# Patient Record
Sex: FEMALE | Race: WHITE | NOT HISPANIC OR LATINO | Employment: OTHER | URBAN - NONMETROPOLITAN AREA
[De-identification: names, ages, dates, MRNs, and addresses within clinical notes are randomized per-mention and may not be internally consistent; named-entity substitution may affect disease eponyms.]

---

## 2018-02-02 ENCOUNTER — APPOINTMENT (OUTPATIENT)
Dept: GENERAL RADIOLOGY | Facility: HOSPITAL | Age: 78
End: 2018-02-02

## 2018-02-02 ENCOUNTER — HOSPITAL ENCOUNTER (EMERGENCY)
Facility: HOSPITAL | Age: 78
Discharge: HOME | End: 2018-02-02
Attending: EMERGENCY MEDICINE | Admitting: EMERGENCY MEDICINE

## 2018-02-02 VITALS
WEIGHT: 171 LBS | SYSTOLIC BLOOD PRESSURE: 150 MMHG | TEMPERATURE: 97.7 F | HEART RATE: 87 BPM | RESPIRATION RATE: 18 BRPM | DIASTOLIC BLOOD PRESSURE: 89 MMHG | OXYGEN SATURATION: 98 % | BODY MASS INDEX: 33.57 KG/M2 | HEIGHT: 60 IN
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
## 2018-02-02 DIAGNOSIS — M79.642 PAIN OF LEFT HAND: Primary | ICD-10-CM

## 2018-02-02 DIAGNOSIS — M79.661 PAIN OF RIGHT LOWER LEG: ICD-10-CM

## 2018-02-02 PROCEDURE — 73610 X-RAY EXAM OF ANKLE: CPT

## 2018-02-02 PROCEDURE — 73130 X-RAY EXAM OF HAND: CPT

## 2018-02-02 PROCEDURE — 73110 X-RAY EXAM OF WRIST: CPT

## 2018-02-02 PROCEDURE — 99283 EMERGENCY DEPT VISIT LOW MDM: CPT

## 2018-02-02 PROCEDURE — 73590 X-RAY EXAM OF LOWER LEG: CPT

## 2018-02-02 PROCEDURE — 73562 X-RAY EXAM OF KNEE 3: CPT

## 2018-02-02 RX ORDER — HYDROCODONE BITARTRATE AND ACETAMINOPHEN 7.5; 325 MG/1; MG/1
1 TABLET ORAL ONCE
Status: COMPLETED | OUTPATIENT
Start: 2018-02-02 | End: 2018-02-02

## 2018-02-02 RX ORDER — AMLODIPINE BESYLATE 10 MG/1
5 TABLET ORAL DAILY
COMMUNITY
End: 2018-02-05

## 2018-02-02 RX ORDER — HYDROCODONE BITARTRATE AND ACETAMINOPHEN 7.5; 325 MG/1; MG/1
1 TABLET ORAL EVERY 6 HOURS PRN
Qty: 12 TABLET | Refills: 0 | Status: SHIPPED | OUTPATIENT
Start: 2018-02-02 | End: 2018-02-05

## 2018-02-02 RX ADMIN — HYDROCODONE BITARTRATE AND ACETAMINOPHEN 1 TABLET: 7.5; 325 TABLET ORAL at 19:35

## 2018-02-05 ENCOUNTER — OFFICE VISIT (OUTPATIENT)
Dept: ORTHOPEDIC SURGERY | Facility: CLINIC | Age: 78
End: 2018-02-05

## 2018-02-05 VITALS — HEIGHT: 60 IN | BODY MASS INDEX: 34.55 KG/M2 | WEIGHT: 176 LBS

## 2018-02-05 DIAGNOSIS — S61.402A OPEN WOUND OF LEFT HAND WITHOUT FOREIGN BODY, UNSPECIFIED WOUND TYPE, INITIAL ENCOUNTER: ICD-10-CM

## 2018-02-05 DIAGNOSIS — S62.115A CLOSED NONDISPLACED FRACTURE OF TRIQUETRUM OF LEFT WRIST, INITIAL ENCOUNTER: ICD-10-CM
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
## 2018-02-05 DIAGNOSIS — M79.642 LEFT HAND PAIN: Primary | ICD-10-CM

## 2018-02-05 PROCEDURE — 99203 OFFICE O/P NEW LOW 30 MIN: CPT | Performed by: NURSE PRACTITIONER

## 2018-02-05 PROCEDURE — 26600 TREAT METACARPAL FRACTURE: CPT | Performed by: NURSE PRACTITIONER
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
## 2018-02-20 DIAGNOSIS — S62.115A CLOSED NONDISPLACED FRACTURE OF TRIQUETRUM OF LEFT WRIST, INITIAL ENCOUNTER: Primary | ICD-10-CM

## 2018-02-21 ENCOUNTER — OFFICE VISIT (OUTPATIENT)
Dept: ORTHOPEDIC SURGERY | Facility: CLINIC | Age: 78
End: 2018-02-21

## 2018-02-21 VITALS — WEIGHT: 169 LBS | HEIGHT: 60 IN | BODY MASS INDEX: 33.18 KG/M2

## 2018-02-21 DIAGNOSIS — M79.642 LEFT HAND PAIN: ICD-10-CM

## 2018-02-21 DIAGNOSIS — S62.115D CLOSED NONDISPLACED FRACTURE OF TRIQUETRUM OF LEFT WRIST WITH ROUTINE HEALING, SUBSEQUENT ENCOUNTER: Primary | ICD-10-CM

## 2018-02-21 PROCEDURE — 99214 OFFICE O/P EST MOD 30 MIN: CPT | Performed by: NURSE PRACTITIONER

## 2018-02-21 RX ORDER — AMLODIPINE BESYLATE 5 MG/1
TABLET ORAL
Refills: 4 | COMMUNITY
Start: 2018-01-22
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
## 2021-01-21 ENCOUNTER — IMMUNIZATION (OUTPATIENT)
Dept: VACCINE CLINIC | Facility: HOSPITAL | Age: 81
End: 2021-01-21

## 2021-01-21 PROCEDURE — 0001A: CPT | Performed by: NURSE PRACTITIONER

## 2021-01-21 PROCEDURE — 91300 HC SARSCOV02 VAC 30MCG/0.3ML IM: CPT | Performed by: NURSE PRACTITIONER

## 2021-02-11 ENCOUNTER — IMMUNIZATION (OUTPATIENT)
Dept: VACCINE CLINIC | Facility: HOSPITAL | Age: 81
End: 2021-02-11

## 2021-02-11 PROCEDURE — 91300 HC SARSCOV02 VAC 30MCG/0.3ML IM: CPT | Performed by: THORACIC SURGERY (CARDIOTHORACIC VASCULAR SURGERY)

## 2021-02-11 PROCEDURE — 0002A: CPT | Performed by: THORACIC SURGERY (CARDIOTHORACIC VASCULAR SURGERY)

## 2021-08-04 ENCOUNTER — TRANSCRIBE ORDERS (OUTPATIENT)
Dept: PODIATRY | Facility: CLINIC | Age: 81
End: 2021-08-04

## 2021-08-04 DIAGNOSIS — B35.1 FUNGAL INFECTION OF NAIL: Primary | ICD-10-CM

## 2021-08-25 ENCOUNTER — OFFICE VISIT (OUTPATIENT)
Dept: PODIATRY | Facility: CLINIC | Age: 81
End: 2021-08-25

## 2021-08-25 VITALS
OXYGEN SATURATION: 95 % | WEIGHT: 169 LBS | DIASTOLIC BLOOD PRESSURE: 82 MMHG | HEART RATE: 88 BPM | BODY MASS INDEX: 33.18 KG/M2 | SYSTOLIC BLOOD PRESSURE: 128 MMHG | HEIGHT: 60 IN

## 2021-08-25 DIAGNOSIS — M79.674 CHRONIC TOE PAIN, BILATERAL: ICD-10-CM

## 2021-08-25 DIAGNOSIS — B35.1 ONYCHOMYCOSIS: Primary | ICD-10-CM

## 2021-08-25 DIAGNOSIS — G89.29 CHRONIC TOE PAIN, BILATERAL: ICD-10-CM

## 2021-08-25 DIAGNOSIS — M79.675 CHRONIC TOE PAIN, BILATERAL: ICD-10-CM

## 2021-08-25 PROCEDURE — 99203 OFFICE O/P NEW LOW 30 MIN: CPT | Performed by: PODIATRIST

## 2021-08-25 PROCEDURE — 11721 DEBRIDE NAIL 6 OR MORE: CPT | Performed by: PODIATRIST

## 2021-08-25 RX ORDER — PRAVASTATIN SODIUM 20 MG
TABLET ORAL
COMMUNITY
Start: 2021-08-10

## 2021-08-25 RX ORDER — LEVOTHYROXINE SODIUM 0.07 MG/1
75 TABLET ORAL DAILY
COMMUNITY
Start: 2021-07-13

## 2021-08-25 RX ORDER — CHLORAL HYDRATE 500 MG
4 CAPSULE ORAL DAILY
COMMUNITY

## 2021-08-25 NOTE — PROGRESS NOTES
Анна Fierro  1940  81 y.o. female    Nondiabetic foot care and burning feet problem     08/25/2021    Chief Complaint   Patient presents with   • Left Foot - Pain, foot care   • Right Foot - Pain, foot care       History of Present Illness    Анна Fierro is a 81 y.o.female who presents to clinic today with chief complaint of toe pain.  Pain is caused by long, thick toenails.  She is unable to trim them due to the thickness.  She also complains of burning in both of her feet.      Past Medical History:   Diagnosis Date   • Bunion    • Hypertension          Past Surgical History:   Procedure Laterality Date   • BLADDER REPAIR     • HYSTERECTOMY     • REPLACEMENT TOTAL KNEE           Family History   Problem Relation Age of Onset   • Osteoporosis Mother    • Diabetes Father    • Heart disease Maternal Aunt    • Heart disease Maternal Uncle    • Osteoporosis Maternal Grandmother    • Heart disease Maternal Grandmother    • Cancer Maternal Grandfather        No Known Allergies    Social History     Socioeconomic History   • Marital status:      Spouse name: Not on file   • Number of children: Not on file   • Years of education: Not on file   • Highest education level: Not on file   Tobacco Use   • Smoking status: Never Smoker         Current Outpatient Medications   Medication Sig Dispense Refill   • amLODIPine (NORVASC) 5 MG tablet TAKE 1 TABLET BY MOUTH DAILY  4   • ascorbic acid (VITAMIN C) 1000 MG tablet Take 1,000 mg by mouth Daily.     • ATENOLOL PO Take  by mouth.     • Calcium 500-125 MG-UNIT tablet Take 1 tablet by mouth Daily.     • levothyroxine (SYNTHROID, LEVOTHROID) 75 MCG tablet Take 75 mcg by mouth Daily.     • Omega-3 Fatty Acids (fish oil) 1000 MG capsule capsule Take 4 capsules by mouth Daily.     • pravastatin (PRAVACHOL) 20 MG tablet        No current facility-administered medications for this visit.       Review of Systems   HENT: Positive for hearing loss.    Respiratory: Positive for  "cough and shortness of breath.    Musculoskeletal: Positive for arthralgias.   Skin: Negative.    Neurological: Positive for numbness.   Psychiatric/Behavioral: Negative.          OBJECTIVE    /82   Pulse 88   Ht 152.4 cm (60\")   Wt 76.7 kg (169 lb)   SpO2 95%   BMI 33.01 kg/m²     Physical Exam  Vitals reviewed.   Constitutional:       General: She is not in acute distress.     Appearance: She is well-developed.   HENT:      Head: Normocephalic and atraumatic.      Nose: Nose normal.   Eyes:      Conjunctiva/sclera: Conjunctivae normal.      Pupils: Pupils are equal, round, and reactive to light.   Pulmonary:      Effort: Pulmonary effort is normal. No respiratory distress.      Breath sounds: No wheezing.   Musculoskeletal:         General: Tenderness present. No deformity. Normal range of motion.   Skin:     General: Skin is warm and dry.      Capillary Refill: Capillary refill takes less than 2 seconds.   Neurological:      Mental Status: She is alert and oriented to person, place, and time.   Psychiatric:         Behavior: Behavior normal.         Thought Content: Thought content normal.          Lower Extremity Exam:     Cardiovascular:    DP/PT pulses palpable b/l    CFT brisk  to all digits b/l  Skin temp is warm to warm from proximal tibia to distal digits b/l  Pedal hair growth present b/l  No erythema or edema noted b/l  Musculoskeletal:  Muscle strength is 5/5 for all muscle groups tested b/l  ROM of the 1st MTP is WNL b/l  ROM of the TMTJ is WNL b/l  ROM of the MTJ is WNL b/l  ROM of the STJ is  WNL b/l  ROM of the ankle joint is  WNL b/l  Dermatological:   Webspaces 1-4 b/l are clean, dry and intact.   No subcutaneous nodules or masses noted  b/l  No open wounds noted b/l  Nails 1-5 b/l are thickened, discolored, elongated with subungual debris.  Pain on palpation to the nail plates.  Neurological:   Protective sensation intact b/l  Sensation intact to light touch b/l       Foot/Ankle " Exam        Procedures        ASSESSMENT AND PLAN    Diagnoses and all orders for this visit:    1. Onychomycosis (Primary)    2. Chronic toe pain, bilateral        - Comprehensive foot and ankle exam performed.   - Nails 1-5 bilateral were debrided in length and thickness with nail nipper and electric  to decrease fungal load and risk of infection.   - All questions were answered to the patients satisfaction.  - RTC 3 months as needed            This document has been electronically signed by Robert Peoples DPM on August 25, 2021 13:37 CDT     8/25/2021  13:37 CDT

## 2021-12-01 ENCOUNTER — OFFICE VISIT (OUTPATIENT)
Dept: PODIATRY | Facility: CLINIC | Age: 81
End: 2021-12-01

## 2021-12-01 VITALS
DIASTOLIC BLOOD PRESSURE: 86 MMHG | OXYGEN SATURATION: 94 % | SYSTOLIC BLOOD PRESSURE: 151 MMHG | HEIGHT: 60 IN | WEIGHT: 169 LBS | BODY MASS INDEX: 33.18 KG/M2 | HEART RATE: 84 BPM

## 2021-12-01 DIAGNOSIS — M79.674 CHRONIC TOE PAIN, BILATERAL: ICD-10-CM

## 2021-12-01 DIAGNOSIS — G89.29 CHRONIC TOE PAIN, BILATERAL: ICD-10-CM

## 2021-12-01 DIAGNOSIS — B35.1 ONYCHOMYCOSIS: Primary | ICD-10-CM

## 2021-12-01 DIAGNOSIS — M79.675 CHRONIC TOE PAIN, BILATERAL: ICD-10-CM

## 2021-12-01 PROCEDURE — 11721 DEBRIDE NAIL 6 OR MORE: CPT | Performed by: PODIATRIST

## 2021-12-01 RX ORDER — AMLODIPINE BESYLATE 5 MG/1
TABLET ORAL DAILY
COMMUNITY
Start: 2021-09-13

## 2021-12-01 NOTE — PROGRESS NOTES
Анна Fierro  1940  81 y.o. female    Nondiabetic foot care     12/01/2021     Chief Complaint   Patient presents with   • Left Foot - non diabetic foot care   • Right Foot - non diabetic foot care       History of Present Illness    Анна Fierro is a 81 y.o.female who presents to clinic today for foot care.      Past Medical History:   Diagnosis Date   • Bunion    • Hypertension          Past Surgical History:   Procedure Laterality Date   • BLADDER REPAIR     • HYSTERECTOMY     • REPLACEMENT TOTAL KNEE           Family History   Problem Relation Age of Onset   • Osteoporosis Mother    • Diabetes Father    • Heart disease Maternal Aunt    • Heart disease Maternal Uncle    • Osteoporosis Maternal Grandmother    • Heart disease Maternal Grandmother    • Cancer Maternal Grandfather        No Known Allergies    Social History     Socioeconomic History   • Marital status:    Tobacco Use   • Smoking status: Never Smoker   • Smokeless tobacco: Never Used   Vaping Use   • Vaping Use: Never used   Substance and Sexual Activity   • Sexual activity: Defer         Current Outpatient Medications   Medication Sig Dispense Refill   • amLODIPine (NORVASC) 5 MG tablet TAKE 1 TABLET BY MOUTH DAILY  4   • amLODIPine (NORVASC) 5 MG tablet Daily.     • ascorbic acid (VITAMIN C) 1000 MG tablet Take 1,000 mg by mouth Daily.     • ATENOLOL PO Take  by mouth.     • Calcium 500-125 MG-UNIT tablet Take 1 tablet by mouth Daily.     • levothyroxine (SYNTHROID, LEVOTHROID) 75 MCG tablet Take 75 mcg by mouth Daily.     • Omega-3 Fatty Acids (fish oil) 1000 MG capsule capsule Take 4 capsules by mouth Daily.     • pravastatin (PRAVACHOL) 20 MG tablet        No current facility-administered medications for this visit.       Review of Systems   HENT: Positive for hearing loss.    Respiratory: Positive for cough and shortness of breath.    Musculoskeletal: Positive for arthralgias.   Skin: Negative.    Neurological: Positive for numbness.  "  Psychiatric/Behavioral: Negative.          OBJECTIVE    /86   Pulse 84   Ht 152.4 cm (60\")   Wt 76.7 kg (169 lb)   SpO2 94%   BMI 33.01 kg/m²     Physical Exam  Vitals reviewed.   Constitutional:       General: She is not in acute distress.     Appearance: She is well-developed.   HENT:      Head: Normocephalic and atraumatic.      Nose: Nose normal.   Eyes:      Conjunctiva/sclera: Conjunctivae normal.      Pupils: Pupils are equal, round, and reactive to light.   Pulmonary:      Effort: Pulmonary effort is normal. No respiratory distress.      Breath sounds: No wheezing.   Musculoskeletal:         General: Tenderness present. No deformity. Normal range of motion.   Skin:     General: Skin is warm and dry.      Capillary Refill: Capillary refill takes less than 2 seconds.   Neurological:      Mental Status: She is alert and oriented to person, place, and time.   Psychiatric:         Behavior: Behavior normal.         Thought Content: Thought content normal.          Lower Extremity Exam:     Cardiovascular:    DP/PT pulses palpable b/l    CFT brisk  to all digits b/l  Skin temp is warm to warm from proximal tibia to distal digits b/l  Pedal hair growth present b/l  No erythema or edema noted b/l  Musculoskeletal:  Muscle strength is 5/5 for all muscle groups tested b/l  ROM of the 1st MTP is WNL b/l  ROM of the TMTJ is WNL b/l  ROM of the MTJ is WNL b/l  ROM of the STJ is  WNL b/l  ROM of the ankle joint is  WNL b/l  Dermatological:   Webspaces 1-4 b/l are clean, dry and intact.   No subcutaneous nodules or masses noted  b/l  No open wounds noted b/l  Nails 1-5 b/l are thickened, discolored, elongated with subungual debris.  Pain on palpation to the nail plates.  Neurological:   Protective sensation intact b/l  Sensation intact to light touch b/l       Foot/Ankle Exam        Procedures        ASSESSMENT AND PLAN    Diagnoses and all orders for this visit:    1. Onychomycosis (Primary)    2. Chronic toe " pain, bilateral          - Nails 1-5 bilateral were debrided in length and thickness with nail nipper and electric  to decrease fungal load and risk of infection.   - All questions were answered to the patients satisfaction.  - RTC 3 months as needed            This document has been electronically signed by Robert Peoples DPM on December 3, 2021 08:51 CST     12/3/2021  08:51 CST

## 2022-03-07 ENCOUNTER — OFFICE VISIT (OUTPATIENT)
Dept: PODIATRY | Facility: CLINIC | Age: 82
End: 2022-03-07

## 2022-03-07 VITALS — BODY MASS INDEX: 33.18 KG/M2 | OXYGEN SATURATION: 97 % | WEIGHT: 169 LBS | HEIGHT: 60 IN | HEART RATE: 72 BPM

## 2022-03-07 DIAGNOSIS — G89.29 CHRONIC TOE PAIN, BILATERAL: ICD-10-CM

## 2022-03-07 DIAGNOSIS — M79.674 CHRONIC TOE PAIN, BILATERAL: ICD-10-CM

## 2022-03-07 DIAGNOSIS — M79.675 CHRONIC TOE PAIN, BILATERAL: ICD-10-CM

## 2022-03-07 DIAGNOSIS — B35.1 ONYCHOMYCOSIS: Primary | ICD-10-CM

## 2022-03-07 PROCEDURE — 11721 DEBRIDE NAIL 6 OR MORE: CPT | Performed by: PODIATRIST

## 2022-03-07 NOTE — PROGRESS NOTES
Анна Fierro  1940  81 y.o. female    Nondiabetic foot care     03/07/2022     Chief Complaint   Patient presents with   • Left Foot - Follow-up     Non diabetic foot care    • Right Foot - Follow-up     Non diabetic foot care        History of Present Illness    Анна Fierro is a 81 y.o.female who presents to clinic today for foot care.      Past Medical History:   Diagnosis Date   • Bunion    • Hypertension          Past Surgical History:   Procedure Laterality Date   • BLADDER REPAIR     • HYSTERECTOMY     • REPLACEMENT TOTAL KNEE           Family History   Problem Relation Age of Onset   • Osteoporosis Mother    • Diabetes Father    • Heart disease Maternal Aunt    • Heart disease Maternal Uncle    • Osteoporosis Maternal Grandmother    • Heart disease Maternal Grandmother    • Cancer Maternal Grandfather        No Known Allergies    Social History     Socioeconomic History   • Marital status:    Tobacco Use   • Smoking status: Never Smoker   • Smokeless tobacco: Never Used   Vaping Use   • Vaping Use: Never used   Substance and Sexual Activity   • Alcohol use: Defer   • Drug use: Defer   • Sexual activity: Defer         Current Outpatient Medications   Medication Sig Dispense Refill   • amLODIPine (NORVASC) 5 MG tablet TAKE 1 TABLET BY MOUTH DAILY  4   • amLODIPine (NORVASC) 5 MG tablet Daily.     • ascorbic acid (VITAMIN C) 1000 MG tablet Take 1,000 mg by mouth Daily.     • ATENOLOL PO Take  by mouth.     • Calcium 500-125 MG-UNIT tablet Take 1 tablet by mouth Daily.     • levothyroxine (SYNTHROID, LEVOTHROID) 75 MCG tablet Take 75 mcg by mouth Daily.     • Omega-3 Fatty Acids (fish oil) 1000 MG capsule capsule Take 4 capsules by mouth Daily.     • pravastatin (PRAVACHOL) 20 MG tablet        No current facility-administered medications for this visit.       Review of Systems   HENT: Positive for hearing loss.    Respiratory: Positive for cough and shortness of breath.    Musculoskeletal: Positive  "for arthralgias.   Skin: Negative.    Neurological: Positive for numbness.   Psychiatric/Behavioral: Negative.          OBJECTIVE    Pulse 72   Ht 152.4 cm (60\")   Wt 76.7 kg (169 lb)   SpO2 97%   BMI 33.01 kg/m²     Physical Exam  Vitals reviewed.   Constitutional:       General: She is not in acute distress.     Appearance: She is well-developed.   HENT:      Head: Normocephalic and atraumatic.      Nose: Nose normal.   Eyes:      Conjunctiva/sclera: Conjunctivae normal.      Pupils: Pupils are equal, round, and reactive to light.   Pulmonary:      Effort: Pulmonary effort is normal. No respiratory distress.      Breath sounds: No wheezing.   Musculoskeletal:         General: Tenderness present. No deformity. Normal range of motion.   Skin:     General: Skin is warm and dry.      Capillary Refill: Capillary refill takes less than 2 seconds.   Neurological:      Mental Status: She is alert and oriented to person, place, and time.   Psychiatric:         Behavior: Behavior normal.         Thought Content: Thought content normal.          Lower Extremity Exam:     Cardiovascular:    DP/PT pulses palpable b/l    CFT brisk  to all digits b/l  Skin temp is warm to warm from proximal tibia to distal digits b/l  Pedal hair growth present b/l  No erythema or edema noted b/l  Musculoskeletal:  Muscle strength is 5/5 for all muscle groups tested b/l  ROM of the 1st MTP is WNL b/l  ROM of the TMTJ is WNL b/l  ROM of the MTJ is WNL b/l  ROM of the STJ is  WNL b/l  ROM of the ankle joint is  WNL b/l  Dermatological:   Webspaces 1-4 b/l are clean, dry and intact.   No subcutaneous nodules or masses noted  b/l  No open wounds noted b/l  Nails 1-5 b/l are thickened, discolored, elongated with subungual debris.  Pain on palpation to the nail plates.  Neurological:   Protective sensation intact b/l  Sensation intact to light touch b/l       Foot/Ankle Exam        Procedures        ASSESSMENT AND PLAN    Diagnoses and all orders " for this visit:    1. Onychomycosis (Primary)    2. Chronic toe pain, bilateral          - Nails 1-5 bilateral were debrided in length and thickness with nail nipper and electric  to decrease fungal load and risk of infection.  An ABN was signed by the patient prior to nail debridement.  - All questions were answered to the patients satisfaction.  - RTC 3 months as needed            This document has been electronically signed by Robert Peoples DPM on March 7, 2022 13:19 CST     3/7/2022  13:19 CST

## 2022-07-27 ENCOUNTER — OFFICE VISIT (OUTPATIENT)
Dept: PODIATRY | Facility: CLINIC | Age: 82
End: 2022-07-27

## 2022-07-27 VITALS — HEIGHT: 60 IN | HEART RATE: 102 BPM | WEIGHT: 169 LBS | BODY MASS INDEX: 33.18 KG/M2 | OXYGEN SATURATION: 98 %

## 2022-07-27 DIAGNOSIS — G89.29 CHRONIC TOE PAIN, BILATERAL: ICD-10-CM

## 2022-07-27 DIAGNOSIS — M79.674 CHRONIC TOE PAIN, BILATERAL: ICD-10-CM

## 2022-07-27 DIAGNOSIS — B35.1 ONYCHOMYCOSIS: Primary | ICD-10-CM

## 2022-07-27 DIAGNOSIS — M79.675 CHRONIC TOE PAIN, BILATERAL: ICD-10-CM

## 2022-07-27 PROCEDURE — 11721 DEBRIDE NAIL 6 OR MORE: CPT | Performed by: PODIATRIST

## 2022-07-27 NOTE — PROGRESS NOTES
Анна Fierro  1940  81 y.o. female        7/27/2022    Chief Complaint   Patient presents with   • Left Foot - Follow-up     NON Diabetic foot care    • Right Foot - Follow-up     NON Diabetic foot care        History of Present Illness    Анна Fierro is a 81 y.o.female who presents to clinic today for foot care.      Past Medical History:   Diagnosis Date   • Bunion    • Hypertension          Past Surgical History:   Procedure Laterality Date   • BLADDER REPAIR     • HYSTERECTOMY     • REPLACEMENT TOTAL KNEE           Family History   Problem Relation Age of Onset   • Osteoporosis Mother    • Diabetes Father    • Heart disease Maternal Aunt    • Heart disease Maternal Uncle    • Osteoporosis Maternal Grandmother    • Heart disease Maternal Grandmother    • Cancer Maternal Grandfather        No Known Allergies    Social History     Socioeconomic History   • Marital status:    Tobacco Use   • Smoking status: Never Smoker   • Smokeless tobacco: Never Used   Vaping Use   • Vaping Use: Never used   Substance and Sexual Activity   • Alcohol use: Defer   • Drug use: Defer   • Sexual activity: Defer         Current Outpatient Medications   Medication Sig Dispense Refill   • amLODIPine (NORVASC) 5 MG tablet TAKE 1 TABLET BY MOUTH DAILY  4   • amLODIPine (NORVASC) 5 MG tablet Daily.     • ascorbic acid (VITAMIN C) 1000 MG tablet Take 1,000 mg by mouth Daily.     • ATENOLOL PO Take  by mouth.     • Calcium 500-125 MG-UNIT tablet Take 1 tablet by mouth Daily.     • levothyroxine (SYNTHROID, LEVOTHROID) 75 MCG tablet Take 75 mcg by mouth Daily.     • Omega-3 Fatty Acids (fish oil) 1000 MG capsule capsule Take 4 capsules by mouth Daily.     • pravastatin (PRAVACHOL) 20 MG tablet        No current facility-administered medications for this visit.       Review of Systems   HENT: Positive for hearing loss.    Respiratory: Positive for cough and shortness of breath.    Musculoskeletal: Positive for arthralgias.   Skin:  "Negative.    Neurological: Positive for numbness.   Psychiatric/Behavioral: Negative.          OBJECTIVE    Pulse 102   Ht 152.4 cm (60\")   Wt 76.7 kg (169 lb)   SpO2 98%   BMI 33.01 kg/m²     Physical Exam  Vitals reviewed.   Constitutional:       General: She is not in acute distress.     Appearance: She is well-developed.   HENT:      Head: Normocephalic and atraumatic.      Nose: Nose normal.   Eyes:      Conjunctiva/sclera: Conjunctivae normal.      Pupils: Pupils are equal, round, and reactive to light.   Pulmonary:      Effort: Pulmonary effort is normal. No respiratory distress.      Breath sounds: No wheezing.   Musculoskeletal:         General: Tenderness present. No deformity. Normal range of motion.   Skin:     General: Skin is warm and dry.      Capillary Refill: Capillary refill takes less than 2 seconds.   Neurological:      Mental Status: She is alert and oriented to person, place, and time.   Psychiatric:         Behavior: Behavior normal.         Thought Content: Thought content normal.          Lower Extremity Exam:     Cardiovascular:    DP/PT pulses palpable b/l    CFT brisk  to all digits b/l  Skin temp is warm to warm from proximal tibia to distal digits b/l  Pedal hair growth present b/l  No erythema or edema noted b/l  Musculoskeletal:  Muscle strength is 5/5 for all muscle groups tested b/l  ROM of the 1st MTP is WNL b/l  ROM of the TMTJ is WNL b/l  ROM of the MTJ is WNL b/l  ROM of the STJ is  WNL b/l  ROM of the ankle joint is  WNL b/l  Dermatological:   Webspaces 1-4 b/l are clean, dry and intact.   No subcutaneous nodules or masses noted  b/l  No open wounds noted b/l  Nails 1-5 b/l are thickened, discolored, elongated with subungual debris.  Pain on palpation to the nail plates.  Neurological:   Protective sensation intact b/l  Sensation intact to light touch b/l       Foot/Ankle Exam        Procedures        ASSESSMENT AND PLAN    Diagnoses and all orders for this visit:    1. " Onychomycosis (Primary)    2. Chronic toe pain, bilateral          - Nails 1-5 bilateral were debrided in length and thickness with nail nipper and electric  to decrease fungal load and risk of infection.  An ABN was signed by the patient prior to nail debridement.  - All questions were answered to the patients satisfaction.  - RTC 3 months as needed            This document has been electronically signed by Robert Peoples DPM on July 29, 2022 09:35 CDT     7/29/2022  09:35 CDT

## 2022-12-02 ENCOUNTER — OFFICE VISIT (OUTPATIENT)
Dept: PODIATRY | Facility: CLINIC | Age: 82
End: 2022-12-02

## 2022-12-02 VITALS — HEIGHT: 60 IN | BODY MASS INDEX: 33.18 KG/M2 | WEIGHT: 169 LBS | HEART RATE: 60 BPM | OXYGEN SATURATION: 97 %

## 2022-12-02 DIAGNOSIS — M79.675 CHRONIC TOE PAIN, BILATERAL: ICD-10-CM

## 2022-12-02 DIAGNOSIS — G89.29 CHRONIC TOE PAIN, BILATERAL: ICD-10-CM

## 2022-12-02 DIAGNOSIS — M79.674 CHRONIC TOE PAIN, BILATERAL: ICD-10-CM

## 2022-12-02 DIAGNOSIS — B35.1 ONYCHOMYCOSIS: Primary | ICD-10-CM

## 2022-12-02 PROCEDURE — 11721 DEBRIDE NAIL 6 OR MORE: CPT | Performed by: NURSE PRACTITIONER

## 2022-12-02 NOTE — PROGRESS NOTES
Анна Fierro  1940  82 y.o. female      12/02/2022    Chief Complaint   Patient presents with   • Left Foot - Follow-up     Nondiabetic foot care    • Right Foot - Follow-up     Nondiabetic foot care        History of Present Illness    Анна Fierro is a 82 y.o.female presents to the clinic today for foot care.     82-year-old  female in the office today for evaluation of nondiabetic foot care.  The patient reports some tenderness in the toenails but otherwise she has no other symptoms except some numbness and tingling.  She denies any fever, chills or evidence of infection.    Past Medical History:   Diagnosis Date   • Bunion    • Hypertension          Past Surgical History:   Procedure Laterality Date   • BLADDER REPAIR     • HYSTERECTOMY     • REPLACEMENT TOTAL KNEE           Family History   Problem Relation Age of Onset   • Osteoporosis Mother    • Diabetes Father    • Heart disease Maternal Aunt    • Heart disease Maternal Uncle    • Osteoporosis Maternal Grandmother    • Heart disease Maternal Grandmother    • Cancer Maternal Grandfather        No Known Allergies    Social History     Socioeconomic History   • Marital status:    Tobacco Use   • Smoking status: Never   • Smokeless tobacco: Never   Vaping Use   • Vaping Use: Never used   Substance and Sexual Activity   • Alcohol use: Defer   • Drug use: Defer   • Sexual activity: Defer         Current Outpatient Medications   Medication Sig Dispense Refill   • amLODIPine (NORVASC) 5 MG tablet TAKE 1 TABLET BY MOUTH DAILY  4   • amLODIPine (NORVASC) 5 MG tablet Daily.     • ascorbic acid (VITAMIN C) 1000 MG tablet Take 1,000 mg by mouth Daily.     • ATENOLOL PO Take  by mouth.     • Calcium 500-125 MG-UNIT tablet Take 1 tablet by mouth Daily.     • levothyroxine (SYNTHROID, LEVOTHROID) 75 MCG tablet Take 75 mcg by mouth Daily.     • Omega-3 Fatty Acids (fish oil) 1000 MG capsule capsule Take 4 capsules by mouth Daily.     • pravastatin  "(PRAVACHOL) 20 MG tablet        No current facility-administered medications for this visit.       Review of Systems   Constitutional: Negative.    HENT: Negative.    Eyes: Negative.    Respiratory: Negative.    Cardiovascular: Negative.    Gastrointestinal: Negative.    Endocrine: Negative.    Genitourinary: Negative.    Musculoskeletal:        Foot pain   Skin: Negative.    Allergic/Immunologic: Negative.    Neurological: Negative.    Hematological: Negative.    Psychiatric/Behavioral: Negative.          OBJECTIVE    Pulse 60   Ht 152.4 cm (60\")   Wt 76.7 kg (169 lb)   SpO2 97%   BMI 33.01 kg/m²     Physical Exam  Vitals reviewed.   Constitutional:       Appearance: Normal appearance. She is well-developed.   HENT:      Head: Normocephalic and atraumatic.   Neck:      Trachea: Trachea and phonation normal.   Cardiovascular:      Pulses:           Dorsalis pedis pulses are 2+ on the right side and 2+ on the left side.        Posterior tibial pulses are 2+ on the right side and 2+ on the left side.   Pulmonary:      Effort: Pulmonary effort is normal. No respiratory distress.   Abdominal:      General: There is no distension.      Palpations: Abdomen is soft.   Musculoskeletal:      Right foot: Normal range of motion.      Left foot: Normal range of motion.   Feet:      Right foot:      Protective Sensation: 10 sites tested. 10 sites sensed.      Skin integrity: Skin integrity normal.      Toenail Condition: Right toenails are abnormally thick and long. Fungal disease present.     Left foot:      Protective Sensation: 10 sites tested. 10 sites sensed.      Skin integrity: Skin integrity normal.      Toenail Condition: Left toenails are abnormally thick and long. Fungal disease present.  Skin:     General: Skin is warm and dry.   Neurological:      Mental Status: She is alert and oriented to person, place, and time.      GCS: GCS eye subscore is 4. GCS verbal subscore is 5. GCS motor subscore is 6.   Psychiatric: "         Speech: Speech normal.         Behavior: Behavior normal. Behavior is cooperative.         Thought Content: Thought content normal.         Judgment: Judgment normal.        Reviewed previous medical records        Procedures        ASSESSMENT AND PLAN    Diagnoses and all orders for this visit:    1. Onychomycosis (Primary)    2. Chronic toe pain, bilateral        - A foot screening exam was performed and the patient was educated on the foot complications related to diabetes,  preventative foot care and what signs and symptoms to watch for.  Instructed to contact our office if any foot problems develop before next visit.  -Discussed treatments for  painful toenails. Treatment options discussed included nail removal versus debridement. Patient elected for routine nail debridement. An ABN has been signed by the patient.  - Toenails 1-5 bilateral were debrided in length and thickness with nail nipper and electric  to decrease fungal load and risk of infection.  - All the patients questions were answered.  - RTC 3 months or sooner if needed.           This document has been electronically signed by Art MATUTE, FNP-C, ONP-C on December 2, 2022 13:05 CST

## 2023-03-13 ENCOUNTER — OFFICE VISIT (OUTPATIENT)
Dept: PODIATRY | Facility: CLINIC | Age: 83
End: 2023-03-13
Payer: MEDICARE

## 2023-03-13 VITALS
SYSTOLIC BLOOD PRESSURE: 155 MMHG | HEIGHT: 60 IN | DIASTOLIC BLOOD PRESSURE: 80 MMHG | HEART RATE: 89 BPM | BODY MASS INDEX: 33.18 KG/M2 | OXYGEN SATURATION: 97 % | WEIGHT: 169 LBS

## 2023-03-13 DIAGNOSIS — M79.674 CHRONIC TOE PAIN, BILATERAL: ICD-10-CM

## 2023-03-13 DIAGNOSIS — M79.675 CHRONIC TOE PAIN, BILATERAL: ICD-10-CM

## 2023-03-13 DIAGNOSIS — G89.29 CHRONIC TOE PAIN, BILATERAL: ICD-10-CM

## 2023-03-13 DIAGNOSIS — B35.1 ONYCHOMYCOSIS: Primary | ICD-10-CM

## 2023-03-13 PROCEDURE — 11721 DEBRIDE NAIL 6 OR MORE: CPT | Performed by: NURSE PRACTITIONER

## 2023-03-13 PROCEDURE — 99212 OFFICE O/P EST SF 10 MIN: CPT | Performed by: NURSE PRACTITIONER

## 2023-03-13 NOTE — PROGRESS NOTES
Анна Fierro  1940  82 y.o. female      3/13/2023    Chief Complaint   Patient presents with   • Left Foot - Follow-up     Non diabetic foot care    • Right Foot - Follow-up     Non diabetic foot care        History of Present Illness    Анна Fierro is a 82 y.o.female presents to the clinic today for non diabetic foot care.         Past Medical History:   Diagnosis Date   • Bunion    • Hypertension          Past Surgical History:   Procedure Laterality Date   • BLADDER REPAIR     • HYSTERECTOMY     • REPLACEMENT TOTAL KNEE           Family History   Problem Relation Age of Onset   • Osteoporosis Mother    • Diabetes Father    • Heart disease Maternal Aunt    • Heart disease Maternal Uncle    • Osteoporosis Maternal Grandmother    • Heart disease Maternal Grandmother    • Cancer Maternal Grandfather        No Known Allergies    Social History     Socioeconomic History   • Marital status:    Tobacco Use   • Smoking status: Never   • Smokeless tobacco: Never   Vaping Use   • Vaping Use: Never used   Substance and Sexual Activity   • Alcohol use: Defer   • Drug use: Defer   • Sexual activity: Defer         Current Outpatient Medications   Medication Sig Dispense Refill   • amLODIPine (NORVASC) 5 MG tablet TAKE 1 TABLET BY MOUTH DAILY  4   • amLODIPine (NORVASC) 5 MG tablet Daily.     • ascorbic acid (VITAMIN C) 1000 MG tablet Take 1 tablet by mouth Daily.     • ATENOLOL PO Take  by mouth.     • Calcium 500-125 MG-UNIT tablet Take 1 tablet by mouth Daily.     • levothyroxine (SYNTHROID, LEVOTHROID) 75 MCG tablet Take 1 tablet by mouth Daily.     • Omega-3 Fatty Acids (fish oil) 1000 MG capsule capsule Take 4 capsules by mouth Daily.     • pravastatin (PRAVACHOL) 20 MG tablet        No current facility-administered medications for this visit.       Review of Systems   Constitutional: Negative.    HENT: Negative.    Eyes: Negative.    Respiratory: Negative.    Cardiovascular: Negative.    Gastrointestinal:  "Negative.    Endocrine: Negative.    Genitourinary: Negative.    Musculoskeletal:        Foot pain   Skin: Negative.    Allergic/Immunologic: Negative.    Neurological: Negative.    Hematological: Negative.    Psychiatric/Behavioral: Negative.          OBJECTIVE    /80   Pulse 89   Ht 152.4 cm (60\")   Wt 76.7 kg (169 lb)   SpO2 97%   BMI 33.01 kg/m²     Physical Exam  Vitals reviewed.   Constitutional:       Appearance: Normal appearance. She is well-developed.   HENT:      Head: Normocephalic and atraumatic.   Neck:      Trachea: Trachea and phonation normal.   Cardiovascular:      Pulses:           Dorsalis pedis pulses are 2+ on the right side and 2+ on the left side.        Posterior tibial pulses are 2+ on the right side and 2+ on the left side.   Pulmonary:      Effort: Pulmonary effort is normal. No respiratory distress.   Abdominal:      General: There is no distension.      Palpations: Abdomen is soft.   Musculoskeletal:      Right foot: Normal range of motion.      Left foot: Normal range of motion.   Feet:      Right foot:      Protective Sensation: 10 sites tested. 10 sites sensed.      Skin integrity: Skin integrity normal.      Toenail Condition: Right toenails are abnormally thick and long. Fungal disease present.     Left foot:      Protective Sensation: 10 sites tested. 10 sites sensed.      Skin integrity: Skin integrity normal.      Toenail Condition: Left toenails are abnormally thick and long. Fungal disease present.  Skin:     General: Skin is warm and dry.   Neurological:      Mental Status: She is alert and oriented to person, place, and time.      GCS: GCS eye subscore is 4. GCS verbal subscore is 5. GCS motor subscore is 6.   Psychiatric:         Speech: Speech normal.         Behavior: Behavior normal. Behavior is cooperative.         Thought Content: Thought content normal.         Judgment: Judgment normal.        Reviewed previous medical " records        Procedures        ASSESSMENT AND PLAN    Diagnoses and all orders for this visit:    1. Onychomycosis (Primary)    2. Chronic toe pain, bilateral        - A foot screening exam was performed and the patient was educated on the foot complications related to diabetes,  preventative foot care and what signs and symptoms to watch for.  Instructed to contact our office if any foot problems develop before next visit.  -Discussed treatments for  painful toenails. Treatment options discussed included nail removal versus debridement. Patient elected for routine nail debridement. An ABN has been signed by the patient.  - Toenails 1-5 bilateral were debrided in length and thickness with nail nipper and electric  to decrease fungal load and risk of infection.  - All the patients questions were answered.  - RTC 3 months or sooner if needed.           This document has been electronically signed by Art MATUTE FNP-C, ONP-C on March 13, 2023 14:59 CDT

## 2023-06-13 ENCOUNTER — OFFICE VISIT (OUTPATIENT)
Dept: PODIATRY | Facility: CLINIC | Age: 83
End: 2023-06-13
Payer: MEDICARE

## 2023-06-13 VITALS — BODY MASS INDEX: 33.18 KG/M2 | WEIGHT: 169 LBS | HEIGHT: 60 IN

## 2023-06-13 DIAGNOSIS — M79.675 CHRONIC TOE PAIN, BILATERAL: ICD-10-CM

## 2023-06-13 DIAGNOSIS — G89.29 CHRONIC TOE PAIN, BILATERAL: ICD-10-CM

## 2023-06-13 DIAGNOSIS — M79.674 CHRONIC TOE PAIN, BILATERAL: ICD-10-CM

## 2023-06-13 DIAGNOSIS — B35.1 ONYCHOMYCOSIS: Primary | ICD-10-CM

## 2023-06-13 NOTE — PROGRESS NOTES
Анна Fierro  1940  82 y.o. female    06/13/2023    Chief Complaint   Patient presents with    Left Foot - Pain, Follow-up    Right Foot - Pain, Follow-up       History of Present Illness    Анна Fierro is a 82 y.o.female presents to the clinic today for non diabetic foot care.         Past Medical History:   Diagnosis Date    Bunion     Hypertension          Past Surgical History:   Procedure Laterality Date    BLADDER REPAIR      HYSTERECTOMY      REPLACEMENT TOTAL KNEE           Family History   Problem Relation Age of Onset    Osteoporosis Mother     Diabetes Father     Heart disease Maternal Aunt     Heart disease Maternal Uncle     Osteoporosis Maternal Grandmother     Heart disease Maternal Grandmother     Cancer Maternal Grandfather        No Known Allergies    Social History     Socioeconomic History    Marital status:    Tobacco Use    Smoking status: Never    Smokeless tobacco: Never   Vaping Use    Vaping Use: Never used   Substance and Sexual Activity    Alcohol use: Defer    Drug use: Defer    Sexual activity: Defer         Current Outpatient Medications   Medication Sig Dispense Refill    amLODIPine (NORVASC) 5 MG tablet TAKE 1 TABLET BY MOUTH DAILY  4    amLODIPine (NORVASC) 5 MG tablet Daily.      ascorbic acid (VITAMIN C) 1000 MG tablet Take 1 tablet by mouth Daily.      ATENOLOL PO Take  by mouth.      Calcium 500-125 MG-UNIT tablet Take 1 tablet by mouth Daily.      levothyroxine (SYNTHROID, LEVOTHROID) 75 MCG tablet Take 1 tablet by mouth Daily.      Omega-3 Fatty Acids (fish oil) 1000 MG capsule capsule Take 4 capsules by mouth Daily.      pravastatin (PRAVACHOL) 20 MG tablet        No current facility-administered medications for this visit.       Review of Systems   Constitutional: Negative.    HENT: Negative.     Eyes: Negative.    Respiratory: Negative.     Cardiovascular: Negative.    Gastrointestinal: Negative.    Endocrine: Negative.    Genitourinary: Negative.   "  Musculoskeletal:         Foot pain   Skin: Negative.    Allergic/Immunologic: Negative.    Neurological: Negative.    Hematological: Negative.    Psychiatric/Behavioral: Negative.         OBJECTIVE    Ht 152.4 cm (60\")   Wt 76.7 kg (169 lb)   BMI 33.01 kg/m²     Physical Exam  Vitals reviewed.   Constitutional:       Appearance: Normal appearance. She is well-developed.   HENT:      Head: Normocephalic and atraumatic.   Neck:      Trachea: Trachea and phonation normal.   Cardiovascular:      Pulses:           Dorsalis pedis pulses are 2+ on the right side and 2+ on the left side.        Posterior tibial pulses are 2+ on the right side and 2+ on the left side.   Pulmonary:      Effort: Pulmonary effort is normal. No respiratory distress.   Abdominal:      General: There is no distension.      Palpations: Abdomen is soft.   Musculoskeletal:      Right foot: Normal range of motion.      Left foot: Normal range of motion.   Feet:      Right foot:      Protective Sensation: 10 sites tested.  10 sites sensed.      Skin integrity: Skin integrity normal.      Toenail Condition: Right toenails are abnormally thick and long. Fungal disease present.     Left foot:      Protective Sensation: 10 sites tested.  10 sites sensed.      Skin integrity: Skin integrity normal.      Toenail Condition: Left toenails are abnormally thick and long. Fungal disease present.  Skin:     General: Skin is warm and dry.   Neurological:      Mental Status: She is alert and oriented to person, place, and time.      GCS: GCS eye subscore is 4. GCS verbal subscore is 5. GCS motor subscore is 6.   Psychiatric:         Speech: Speech normal.         Behavior: Behavior normal. Behavior is cooperative.         Thought Content: Thought content normal.         Judgment: Judgment normal.      Reviewed previous medical records        Procedures        ASSESSMENT AND PLAN    Diagnoses and all orders for this visit:    1. Onychomycosis (Primary)    2. " Chronic toe pain, bilateral        - A foot screening exam was performed and education provided on  preventative foot care and what signs and symptoms to watch for.  Instructed to contact our office if any foot problems develop before next visit.  -Discussed treatments for  painful toenails. Treatment options discussed included nail removal versus debridement. Patient elected for routine nail debridement. An ABN has been signed by the patient.  - Toenails 1-5 bilateral were debrided in length and thickness with nail nipper and electric  to decrease fungal load and risk of infection.  - All the patients questions were answered.  - RTC 3 months or sooner if needed.           This document has been electronically signed by Art MATUTE, FNP-C, ONP-C on June 13, 2023 13:23 CDT

## 2023-09-22 ENCOUNTER — OFFICE VISIT (OUTPATIENT)
Dept: PODIATRY | Facility: CLINIC | Age: 83
End: 2023-09-22
Payer: MEDICARE

## 2023-09-22 VITALS
HEIGHT: 60 IN | WEIGHT: 169 LBS | HEART RATE: 89 BPM | BODY MASS INDEX: 33.18 KG/M2 | OXYGEN SATURATION: 98 % | DIASTOLIC BLOOD PRESSURE: 93 MMHG | SYSTOLIC BLOOD PRESSURE: 137 MMHG

## 2023-09-22 DIAGNOSIS — G89.29 CHRONIC TOE PAIN, BILATERAL: ICD-10-CM

## 2023-09-22 DIAGNOSIS — M79.674 CHRONIC TOE PAIN, BILATERAL: ICD-10-CM

## 2023-09-22 DIAGNOSIS — M79.675 CHRONIC TOE PAIN, BILATERAL: ICD-10-CM

## 2023-09-22 DIAGNOSIS — B35.1 ONYCHOMYCOSIS: Primary | ICD-10-CM

## 2023-09-22 PROCEDURE — 99213 OFFICE O/P EST LOW 20 MIN: CPT | Performed by: NURSE PRACTITIONER

## 2023-09-22 NOTE — PROGRESS NOTES
Анна Fierro  1940  83 y.o. female  PCP: Sandy Antony 08/30/2023  Non-Diabetic      09/22/2023      Chief Complaint   Patient presents with    Left Foot - Follow-up, Nail Problem    Right Foot - Follow-up, Nail Problem       History of Present Illness    Анна Fierro is a 83 y.o.female. Patient presents to the clinic today for non diabetic foot care.         Past Medical History:   Diagnosis Date    Bunion     Hypertension          Past Surgical History:   Procedure Laterality Date    BLADDER REPAIR      HYSTERECTOMY      REPLACEMENT TOTAL KNEE           Family History   Problem Relation Age of Onset    Osteoporosis Mother     Diabetes Father     Heart disease Maternal Aunt     Heart disease Maternal Uncle     Osteoporosis Maternal Grandmother     Heart disease Maternal Grandmother     Cancer Maternal Grandfather        No Known Allergies    Social History     Socioeconomic History    Marital status:    Tobacco Use    Smoking status: Never    Smokeless tobacco: Never   Vaping Use    Vaping Use: Never used   Substance and Sexual Activity    Alcohol use: Defer    Drug use: Defer    Sexual activity: Defer         Current Outpatient Medications   Medication Sig Dispense Refill    amLODIPine (NORVASC) 5 MG tablet TAKE 1 TABLET BY MOUTH DAILY  4    amLODIPine (NORVASC) 5 MG tablet Daily.      ascorbic acid (VITAMIN C) 1000 MG tablet Take 1 tablet by mouth Daily.      ATENOLOL PO Take  by mouth.      Calcium 500-125 MG-UNIT tablet Take 1 tablet by mouth Daily.      levothyroxine (SYNTHROID, LEVOTHROID) 75 MCG tablet Take 1 tablet by mouth Daily.      Omega-3 Fatty Acids (fish oil) 1000 MG capsule capsule Take 4 capsules by mouth Daily.      pravastatin (PRAVACHOL) 20 MG tablet        No current facility-administered medications for this visit.       Review of Systems   Constitutional: Negative.    HENT: Negative.     Eyes: Negative.    Respiratory: Negative.     Cardiovascular: Negative.   "  Gastrointestinal: Negative.    Endocrine: Negative.    Genitourinary: Negative.    Musculoskeletal:         Foot pain   Skin: Negative.    Allergic/Immunologic: Negative.    Neurological: Negative.    Hematological: Negative.    Psychiatric/Behavioral: Negative.     All other systems reviewed and are negative.      OBJECTIVE    /93   Pulse 89   Ht 152.4 cm (60\")   Wt 76.7 kg (169 lb)   SpO2 98%   BMI 33.01 kg/m²     Physical Exam  Vitals reviewed.   Constitutional:       Appearance: Normal appearance. She is well-developed.   HENT:      Head: Normocephalic and atraumatic.   Neck:      Trachea: Trachea and phonation normal.   Cardiovascular:      Pulses:           Dorsalis pedis pulses are 2+ on the right side and 2+ on the left side.        Posterior tibial pulses are 2+ on the right side and 2+ on the left side.   Pulmonary:      Effort: Pulmonary effort is normal. No respiratory distress.   Abdominal:      General: There is no distension.      Palpations: Abdomen is soft.   Musculoskeletal:      Right foot: Normal range of motion.      Left foot: Normal range of motion.   Feet:      Right foot:      Protective Sensation: 10 sites tested.  10 sites sensed.      Skin integrity: Skin integrity normal.      Toenail Condition: Right toenails are abnormally thick and long. Fungal disease present.     Left foot:      Protective Sensation: 10 sites tested.  10 sites sensed.      Skin integrity: Skin integrity normal.      Toenail Condition: Left toenails are abnormally thick and long. Fungal disease present.  Skin:     General: Skin is warm and dry.   Neurological:      Mental Status: She is alert and oriented to person, place, and time.      GCS: GCS eye subscore is 4. GCS verbal subscore is 5. GCS motor subscore is 6.   Psychiatric:         Speech: Speech normal.         Behavior: Behavior normal. Behavior is cooperative.         Thought Content: Thought content normal.         Judgment: Judgment normal.    "   Reviewed previous medical records        Procedures        ASSESSMENT AND PLAN    Diagnoses and all orders for this visit:    1. Onychomycosis (Primary)    2. Chronic toe pain, bilateral        - A foot screening exam was performed and education provided on  preventative foot care and what signs and symptoms to watch for.  Instructed to contact our office if any foot problems develop before next visit.  -Discussed treatments for  painful toenails. Treatment options discussed included nail removal versus debridement. Patient elected for routine nail debridement. An ABN has been signed by the patient.  - Toenails 1-5 bilateral were debrided in length and thickness with nail nipper and electric  to decrease fungal load and risk of infection.  - All the patients questions were answered.  - RTC 3 months or sooner if needed.           This document has been electronically signed by Art MATUTE, FNP-C, ONP-C on September 22, 2023 15:12 CDT